# Patient Record
Sex: FEMALE | Race: WHITE | NOT HISPANIC OR LATINO | Employment: UNEMPLOYED
[De-identification: names, ages, dates, MRNs, and addresses within clinical notes are randomized per-mention and may not be internally consistent; named-entity substitution may affect disease eponyms.]

---

## 2017-09-03 ENCOUNTER — HEALTH MAINTENANCE LETTER (OUTPATIENT)
Age: 57
End: 2017-09-03

## 2022-04-28 ENCOUNTER — TRANSFERRED RECORDS (OUTPATIENT)
Dept: MULTI SPECIALTY CLINIC | Facility: CLINIC | Age: 62
End: 2022-04-28

## 2022-04-28 LAB — COLOGUARD-ABSTRACT: POSITIVE

## 2022-10-25 ENCOUNTER — TELEPHONE (OUTPATIENT)
Dept: FAMILY MEDICINE | Facility: CLINIC | Age: 62
End: 2022-10-25

## 2022-10-25 ENCOUNTER — ANTICOAGULATION THERAPY VISIT (OUTPATIENT)
Dept: ANTICOAGULATION | Facility: CLINIC | Age: 62
End: 2022-10-25

## 2022-10-25 ENCOUNTER — OFFICE VISIT (OUTPATIENT)
Dept: FAMILY MEDICINE | Facility: CLINIC | Age: 62
End: 2022-10-25
Payer: MEDICAID

## 2022-10-25 DIAGNOSIS — Z13.220 SCREENING FOR HYPERLIPIDEMIA: ICD-10-CM

## 2022-10-25 DIAGNOSIS — M79.671 BILATERAL FOOT PAIN: ICD-10-CM

## 2022-10-25 DIAGNOSIS — E66.01 MORBID OBESITY WITH BMI OF 50.0-59.9, ADULT (H): ICD-10-CM

## 2022-10-25 DIAGNOSIS — Z79.01 CHRONIC ANTICOAGULATION: Primary | ICD-10-CM

## 2022-10-25 DIAGNOSIS — Z11.59 NEED FOR HEPATITIS C SCREENING TEST: ICD-10-CM

## 2022-10-25 DIAGNOSIS — Z12.31 VISIT FOR SCREENING MAMMOGRAM: ICD-10-CM

## 2022-10-25 DIAGNOSIS — I27.20 PULMONARY HYPERTENSION (H): ICD-10-CM

## 2022-10-25 DIAGNOSIS — J96.11 CHRONIC RESPIRATORY FAILURE WITH HYPOXIA, ON HOME OXYGEN THERAPY (H): ICD-10-CM

## 2022-10-25 DIAGNOSIS — Z99.81 CHRONIC RESPIRATORY FAILURE WITH HYPOXIA, ON HOME OXYGEN THERAPY (H): ICD-10-CM

## 2022-10-25 DIAGNOSIS — E78.5 DYSLIPIDEMIA ASSOCIATED WITH TYPE 2 DIABETES MELLITUS (H): ICD-10-CM

## 2022-10-25 DIAGNOSIS — Z79.01 CHRONIC ANTICOAGULATION: ICD-10-CM

## 2022-10-25 DIAGNOSIS — I48.20 CHRONIC ATRIAL FIBRILLATION (H): ICD-10-CM

## 2022-10-25 DIAGNOSIS — J96.21 ACUTE ON CHRONIC RESPIRATORY FAILURE WITH HYPOXIA (H): ICD-10-CM

## 2022-10-25 DIAGNOSIS — E03.9 HYPOTHYROIDISM, UNSPECIFIED TYPE: ICD-10-CM

## 2022-10-25 DIAGNOSIS — Z11.4 SCREENING FOR HIV (HUMAN IMMUNODEFICIENCY VIRUS): ICD-10-CM

## 2022-10-25 DIAGNOSIS — E66.2 OBESITY HYPOVENTILATION SYNDROME (H): ICD-10-CM

## 2022-10-25 DIAGNOSIS — E11.69 DYSLIPIDEMIA ASSOCIATED WITH TYPE 2 DIABETES MELLITUS (H): ICD-10-CM

## 2022-10-25 DIAGNOSIS — M79.672 BILATERAL FOOT PAIN: ICD-10-CM

## 2022-10-25 DIAGNOSIS — I50.42 CHRONIC COMBINED SYSTOLIC AND DIASTOLIC CONGESTIVE HEART FAILURE (H): ICD-10-CM

## 2022-10-25 DIAGNOSIS — E11.8 TYPE 2 DIABETES MELLITUS WITH COMPLICATION, WITHOUT LONG-TERM CURRENT USE OF INSULIN (H): Primary | ICD-10-CM

## 2022-10-25 PROBLEM — I71.40 ABDOMINAL AORTIC ANEURYSM (AAA) WITHOUT RUPTURE (H): Status: ACTIVE | Noted: 2020-03-09

## 2022-10-25 PROBLEM — R79.0 LOW MAGNESIUM LEVELS: Status: ACTIVE | Noted: 2017-11-28

## 2022-10-25 PROBLEM — I51.7 CARDIOMEGALY: Status: ACTIVE | Noted: 2017-03-08

## 2022-10-25 PROBLEM — R19.5 POSITIVE COLORECTAL CANCER SCREENING USING COLOGUARD TEST: Status: ACTIVE | Noted: 2022-05-10

## 2022-10-25 PROBLEM — D50.9 IRON DEFICIENCY ANEMIA: Status: ACTIVE | Noted: 2017-07-04

## 2022-10-25 PROBLEM — L40.9 SCALP PSORIASIS: Status: ACTIVE | Noted: 2018-06-08

## 2022-10-25 PROBLEM — M17.0 PRIMARY OSTEOARTHRITIS OF BOTH KNEES: Status: ACTIVE | Noted: 2018-01-23

## 2022-10-25 PROBLEM — I20.89 CHRONIC STABLE ANGINA (H): Status: ACTIVE | Noted: 2021-11-27

## 2022-10-25 PROBLEM — L20.9 ATOPIC DERMATITIS, UNSPECIFIED TYPE: Status: ACTIVE | Noted: 2017-11-28

## 2022-10-25 LAB
ERYTHROCYTE [DISTWIDTH] IN BLOOD BY AUTOMATED COUNT: 14.8 % (ref 10–15)
HBA1C MFR BLD: 6 % (ref 0–5.6)
HCT VFR BLD AUTO: 41 % (ref 35–47)
HGB BLD-MCNC: 12.3 G/DL (ref 11.7–15.7)
INR PPP: 2.53 (ref 0.85–1.15)
MCH RBC QN AUTO: 30.5 PG (ref 26.5–33)
MCHC RBC AUTO-ENTMCNC: 30 G/DL (ref 31.5–36.5)
MCV RBC AUTO: 102 FL (ref 78–100)
PLATELET # BLD AUTO: 204 10E3/UL (ref 150–450)
RBC # BLD AUTO: 4.03 10E6/UL (ref 3.8–5.2)
WBC # BLD AUTO: 8.7 10E3/UL (ref 4–11)

## 2022-10-25 PROCEDURE — 99205 OFFICE O/P NEW HI 60 MIN: CPT | Mod: 25 | Performed by: PHYSICIAN ASSISTANT

## 2022-10-25 PROCEDURE — 87389 HIV-1 AG W/HIV-1&-2 AB AG IA: CPT | Performed by: PHYSICIAN ASSISTANT

## 2022-10-25 PROCEDURE — 85027 COMPLETE CBC AUTOMATED: CPT | Performed by: PHYSICIAN ASSISTANT

## 2022-10-25 PROCEDURE — 90686 IIV4 VACC NO PRSV 0.5 ML IM: CPT | Performed by: PHYSICIAN ASSISTANT

## 2022-10-25 PROCEDURE — 90471 IMMUNIZATION ADMIN: CPT | Performed by: PHYSICIAN ASSISTANT

## 2022-10-25 PROCEDURE — 83036 HEMOGLOBIN GLYCOSYLATED A1C: CPT | Performed by: PHYSICIAN ASSISTANT

## 2022-10-25 PROCEDURE — 80053 COMPREHEN METABOLIC PANEL: CPT | Performed by: PHYSICIAN ASSISTANT

## 2022-10-25 PROCEDURE — 0124A COVID-19,PF,PFIZER BOOSTER BIVALENT: CPT | Performed by: PHYSICIAN ASSISTANT

## 2022-10-25 PROCEDURE — 85610 PROTHROMBIN TIME: CPT | Performed by: PHYSICIAN ASSISTANT

## 2022-10-25 PROCEDURE — 84443 ASSAY THYROID STIM HORMONE: CPT | Performed by: PHYSICIAN ASSISTANT

## 2022-10-25 PROCEDURE — 80061 LIPID PANEL: CPT | Performed by: PHYSICIAN ASSISTANT

## 2022-10-25 PROCEDURE — 36415 COLL VENOUS BLD VENIPUNCTURE: CPT | Performed by: PHYSICIAN ASSISTANT

## 2022-10-25 PROCEDURE — 86803 HEPATITIS C AB TEST: CPT | Performed by: PHYSICIAN ASSISTANT

## 2022-10-25 PROCEDURE — 91312 COVID-19,PF,PFIZER BOOSTER BIVALENT: CPT | Performed by: PHYSICIAN ASSISTANT

## 2022-10-25 RX ORDER — METOPROLOL SUCCINATE 50 MG/1
50 TABLET, EXTENDED RELEASE ORAL
COMMUNITY
Start: 2022-10-03

## 2022-10-25 RX ORDER — POTASSIUM CHLORIDE 1500 MG/1
TABLET, EXTENDED RELEASE ORAL
COMMUNITY
Start: 2022-04-19 | End: 2023-01-20

## 2022-10-25 RX ORDER — LISINOPRIL 2.5 MG/1
1 TABLET ORAL DAILY
COMMUNITY
Start: 2022-10-03 | End: 2023-01-19

## 2022-10-25 RX ORDER — ERGOCALCIFEROL 1.25 MG/1
CAPSULE ORAL
COMMUNITY
Start: 2022-02-08

## 2022-10-25 RX ORDER — CETIRIZINE HYDROCHLORIDE 10 MG/1
TABLET ORAL
COMMUNITY
Start: 2022-06-01

## 2022-10-25 RX ORDER — WARFARIN SODIUM 5 MG/1
TABLET ORAL
COMMUNITY
Start: 2021-12-11 | End: 2022-12-13

## 2022-10-25 RX ORDER — CYCLOBENZAPRINE HCL 10 MG
TABLET ORAL
COMMUNITY
Start: 2022-08-02

## 2022-10-25 RX ORDER — LEVOTHYROXINE SODIUM 175 UG/1
1 TABLET ORAL
COMMUNITY
Start: 2022-04-19 | End: 2023-01-19

## 2022-10-25 RX ORDER — IRON POLYSACCHARIDE COMPLEX 150 MG
1 CAPSULE ORAL DAILY
COMMUNITY
Start: 2022-08-03 | End: 2022-12-13

## 2022-10-25 RX ORDER — LIRAGLUTIDE 6 MG/ML
1.8 INJECTION SUBCUTANEOUS
COMMUNITY
Start: 2022-04-26

## 2022-10-25 RX ORDER — ATORVASTATIN CALCIUM 40 MG/1
1 TABLET, FILM COATED ORAL DAILY
COMMUNITY
Start: 2022-10-03 | End: 2023-01-19

## 2022-10-25 RX ORDER — ALBUTEROL SULFATE 90 UG/1
2 AEROSOL, METERED RESPIRATORY (INHALATION) EVERY 6 HOURS
Qty: 18 G | Refills: 1 | Status: SHIPPED | OUTPATIENT
Start: 2022-10-25 | End: 2023-01-31

## 2022-10-25 RX ORDER — SPIRONOLACTONE 25 MG/1
1 TABLET ORAL DAILY
COMMUNITY
Start: 2021-10-15

## 2022-10-25 RX ORDER — TRAZODONE HYDROCHLORIDE 100 MG/1
2 TABLET ORAL AT BEDTIME
COMMUNITY
Start: 2022-10-03

## 2022-10-25 RX ORDER — PREGABALIN 100 MG/1
CAPSULE ORAL
COMMUNITY
Start: 2022-08-02

## 2022-10-25 NOTE — PROGRESS NOTES
Unable to reach Marlene today.    No instructions provided. Unable to leave voicemail.    Follow up required to confirm warfarin dose taken and assess for changes, needs warfarin dosing for tonight, expecting venous result to result after we leave for the night    Cora Miller RN  Anticoagulation Clinic  10/25/2022

## 2022-10-25 NOTE — Clinical Note
Positive cologuard 04/28/22.  Colonoscopy completed 6/1/22 at [facility]. Repeat in 10 years.

## 2022-10-25 NOTE — PROGRESS NOTES
Assessment & Plan     Type 2 diabetes mellitus with complication, without long-term current use of insulin (H)  Dyslipidemia associated with type 2 diabetes mellitus (H)  Patient states she does not refills of medications today. Will obtain labs. Significant time spent today in chart review, discussion of referrals needed.    - Comprehensive metabolic panel (BMP + Alb, Alk Phos, ALT, AST, Total. Bili, TP); Future  - CBC with platelets; Future  - Hemoglobin A1c; Future  - Comprehensive metabolic panel (BMP + Alb, Alk Phos, ALT, AST, Total. Bili, TP)  - CBC with platelets  - Hemoglobin A1c    Acute on chronic respiratory failure with hypoxia (H)  Chronic respiratory failure with hypoxia, on home oxygen therapy (H)  Obesity hypoventilation syndrome (H)  Pulmonary hypertension (H)  Initial O2 sat today was 71% which was very possibly an error. Patient able to speak in full sentences without shortness of breath. She is not on oxygen during the visit as she does not have portable tanks - these are being delivered to her home tomorrow.   - Adult Pulmonary Medicine Referral; Future  - albuterol (PROAIR HFA/PROVENTIL HFA/VENTOLIN HFA) 108 (90 Base) MCG/ACT inhaler; Inhale 2 puffs into the lungs every 6 hours    Chronic anticoagulation  Referral placed for INR clinic.   - Anticoagulation Clinic Referral  - INR; Future  - Adult Cardiology Eval  Referral; Future  - INR    Chronic combined systolic and diastolic congestive heart failure (H)  Chronic atrial fibrillation (H)  Patient needs follow up with cardiology. Referral placed. Last echo 12/8/21, cardiac cath 12/6/21 at [facility].  - Adult Cardiology Eval  Referral; Future    Screening for HIV (human immunodeficiency virus)  Screen.  - HIV Antigen Antibody Combo; Future  - HIV Antigen Antibody Combo    Bilateral foot pain  Patient reporting ongoing bilateral foot pain. Has a corn on her left great toe that causes discomfort. Would like to see podiatry.   -  Orthopedic  Referral; Future    Hypothyroidism, unspecified type  Recheck labs.   - TSH with free T4 reflex; Future  - TSH with free T4 reflex    Need for hepatitis C screening test  Screen.  - Hepatitis C Screen Reflex to HCV RNA Quant and Genotype; Future  - Hepatitis C Screen Reflex to HCV RNA Quant and Genotype  - Hepatitis C RNA, Quantitative by PCR with Confirmatory Reflex to Genotyping    Screening for hyperlipidemia  Screen.  - Lipid panel reflex to direct LDL Non-fasting; Future  - Lipid panel reflex to direct LDL Non-fasting    Morbid obesity with BMI of 50.0-59.9 (H)  Encouraged healthy diet and weight loss    Significant time spent today in chart review, medication reconciliation, placing referrals, ordering labs, disability parking, etc. Encouraged patient to follow up with one of my MD colleagues to establish care due to the complexity of her history. She understands.     Colonoscopy completed 6/2022. Sent to abstracting.        60 minutes spent on the date of the encounter doing chart review, history and exam, documentation and further activities per the note           Return in about 4 weeks (around 11/22/2022) for establish care visit .    Amparo Mclean PA-C  [facility] DOROTEO Rosario is a 62 year old accompanied by her friend- Valentine, presenting for the following health issues:  Establish Care (INR needed)      History of Present Illness       Back Pain:  She presents for follow up of back pain. Patient's back pain is a chronic problem.  Location of back pain:  Right lower back, left lower back, left side of neck and left shoulder  Description of back pain: dull ache, sharp and other  Back pain spreads: left knee    Since patient first noticed back pain, pain is: unchanged  Does back pain interfere with her job:  Not applicable      Diabetes:   She presents for follow up of diabetes.  She is checking home blood glucose a few times a week. She checks blood  "glucose before meals.  Blood glucose is never over 200 and sometimes under 70. She is aware of hypoglycemia symptoms including shakiness, weakness and confusion. She has no concerns regarding her diabetes at this time.  She is having redness, sores, or blisters on feet.         Hypothyroidism:     Since last visit, patient describes the following symptoms::  Dry skin    Headaches:   Since the patient's last clinic visit, headaches are: no change  The patient is getting headaches:  Everyday i feel like i can hear my brains pounding  She is able to do normal daily activities when she has a migraine.  The patient is taking the following rescue/relief medications:  Ibuprofen (Advil, Motrin) and Tylenol   Patient states \"I get some relief\" from the rescue/relief medications.   The patient is taking the following medications to prevent migraines:  No medications to prevent migraines  In the past 4 weeks, the patient has gone to an Urgent Care or Emergency Room 0 times times due to headaches.    She eats 2-3 servings of fruits and vegetables daily.She consumes 0 sweetened beverage(s) daily.She exercises with enough effort to increase her heart rate 9 or less minutes per day.  She exercises with enough effort to increase her heart rate 5 days per week. She is missing 1 dose(s) of medications per week.     Patient here today to establish care with [facility]. She recently moved from [place], [place]. Moved from [facility], now living with son. Has 2 sons who live in the [facility] area.      O2 sat at home has been around 95%. Ran out of tanks for when she needs to leave the house- getting some delivered. Not short of breath today.     Last labs were in July. Here for A1c check, INR check and to have referrals for specialists if needed. Would like disability parking.     Positive cologuard 04/28/22.   Colonoscopy completed 6/1/22 at [facility]. Repeat in 10 years.     Heart Failure  Last cardiology visit 02/2020 " "  Chronic afib. On warfarin.    Diabetes  Feels she eats a good diet. States she had a nutty bar and a brownie for breakfast today and that is all she has eaten (it is 2 pm).   Little to no exercise, has foot pain when she walks too much. Denies numbness or tingling into feet.   Last diabetic eye exam 2/7/22. She has posterior vitreous detachment with vitreous hemorrhage OS with a differential of likely poor preproliferative diabetic retinopathy.      Pulmonary hypertension  On home oxygen. 4-5 L.             Review of Systems   Constitutional, HEENT, cardiovascular, pulmonary, gi and gu systems are negative, except as otherwise noted.      Objective    /60 (BP Location: Right arm, Patient Position: Sitting, Cuff Size: Adult Regular)   Pulse 67   Temp 97.4  F (36.3  C) (Oral)   Ht 1.753 m (5' 9\")   Wt (!) 174.7 kg (385 lb 3.2 oz)   SpO2 (!) 87%   BMI 56.88 kg/m    Body mass index is 56.88 kg/m .  Physical Exam   GENERAL: healthy, alert and no distress  RESP: lungs clear to auscultation - no rales, rhonchi or wheezes  CV: regular rate and rhythm, normal S1 S2, no S3 or S4, no murmur, click or rub, no peripheral edema and peripheral pulses strong  Diabetic foot exam: normal DP and PT pulses and no trophic changes or ulcerative lesions. Corn on left great toe.        07/26/22  INR 3.4  GFR 56  A1c 6.0                          "

## 2022-10-25 NOTE — TELEPHONE ENCOUNTER
Anticoagulation Management    Discussed INR home monitoring program with Marlene Snider reviewing:      Elibigility requirements: >= 3 months of anticoagulation therapy, indication for chronic anticoagulation and order from provider    Required testing frequency (q1-2 weeks)    Home meters, testing supplies, meter training, and reporting of INR results done through an outside company. Patient would be contacted by home monitoring company to review insurance coverage with home monitoring company prior to enrolling.    [employer] would continue to receive and manage INR results.    Home monitoring application may take several weeks and must continue to follow up with recommended INR monitoring in clinic until receives monitor and training completed.     Home monitoring terms reviewed with patient      Patient agrees to frequency of testing as directed by referring provider ( weekly or biweekly) Yes    Testing to be performed during business hours of [facility] Yes    Patient agrees they have the skill (or a designated caregiver) necessary to perform the self test Yes    Patient agrees to report all INR results to INR home monitoring company Yes    Patient agrees to have additional INR test in clinic if a home result is critical Yes    Patient agrees to use a [employer] approved service provider and device for home monitoring Yes    Tan Bowman    Referring provider: Amparo Mclean PA-C    Referring providers Clinic Fax number [phone]    Marlene Snider is interested home INR monitoring and requests order be submitted.

## 2022-10-25 NOTE — PROGRESS NOTES
ANTICOAGULATION  MANAGEMENT: NEW REFERRAL      SUBJECTIVE/OBJECTIVE     Marlene Snider, a 62 year old female  is newly referred to [facility].    Anticoagulation:    Previously on warfarin: yes, currently on warfarin transferring anticoagulation management to [facility]. Previously managed by Virgil ABEL. Most recent warfarin dose 5 mg Mon/Wed/Fri and 7.5 mg ROW since 7/26/22.  Most recent INR 3.4  Warfarin initiation date (approximate): been on over 10 years   Indication(s): Atrial Fibrillation   Goal Range: 2.0-3.0   Anticoagulation Bridge/Overlap No   Referring provider: from PCP    General Dietary/Social Hx:    Typical vitamin K intake: low; consistent     Other dietary considerations: None     Social History: Typical alcohol intake: occasional    In the past 2 weeks, patient estimates taking medications as instructed % of time: rarely misses    Results:        No results for input(s): INR, [id number], F2, ALMWH in the last 168 hours.    Wt Readings from Last 2 Encounters:   10/25/22 (!) 174.7 kg (385 lb 3.2 oz)      There is no height or weight on file to calculate BMI.  No results found for: AST  Lab Results   Component Value Date    CR 0.60 06/09/2006     CrCl cannot be calculated (Patient's most recent lab result is older than the maximum 30 days allowed.).    ASSESSMENT       Goal INR 2-3, standard for indication(s) above    On warfarin > 30 days; maintenance dose has been established        PLAN     Dosing Instructions: Take 7.5 mg tonight with INR in      Summary  As of 10/25/2022    Full warfarin instructions:  5 mg every Mon, Wed, Fri; 7.5 mg all other days; Starting 10/25/2022   Next INR check:               Education provided:     Please call back if any changes to your diet, medications or how you've been taking warfarin    Contact [phone]  with any changes, questions or concerns.     Education still needed:     None required      Telephone  call with Marlene who verbalizes understanding and agrees to plan    Not scheduled yet    Standing orders placed in Epic: Point of Care INR (Lab 5000)    Plan made per [facility] anticoagulation protocol    Cora Miller RN  Anticoagulation Clinic  10/25/2022

## 2022-10-26 VITALS
WEIGHT: 293 LBS | HEIGHT: 69 IN | DIASTOLIC BLOOD PRESSURE: 60 MMHG | OXYGEN SATURATION: 87 % | SYSTOLIC BLOOD PRESSURE: 114 MMHG | TEMPERATURE: 97.4 F | HEART RATE: 67 BPM | BODY MASS INDEX: 43.4 KG/M2

## 2022-10-26 PROBLEM — R19.5 POSITIVE COLORECTAL CANCER SCREENING USING COLOGUARD TEST: Status: RESOLVED | Noted: 2022-05-10 | Resolved: 2022-10-26

## 2022-10-26 LAB
ALBUMIN SERPL-MCNC: 3.4 G/DL (ref 3.4–5)
ALP SERPL-CCNC: 140 U/L (ref 40–150)
ALT SERPL W P-5'-P-CCNC: 16 U/L (ref 0–50)
ANION GAP SERPL CALCULATED.3IONS-SCNC: 7 MMOL/L (ref 3–14)
AST SERPL W P-5'-P-CCNC: 17 U/L (ref 0–45)
BILIRUB SERPL-MCNC: 0.4 MG/DL (ref 0.2–1.3)
BUN SERPL-MCNC: 25 MG/DL (ref 7–30)
CALCIUM SERPL-MCNC: 8.4 MG/DL (ref 8.5–10.1)
CHLORIDE BLD-SCNC: 99 MMOL/L (ref 94–109)
CHOLEST SERPL-MCNC: 115 MG/DL
CO2 SERPL-SCNC: 33 MMOL/L (ref 20–32)
CREAT SERPL-MCNC: 0.75 MG/DL (ref 0.52–1.04)
FASTING STATUS PATIENT QL REPORTED: YES
GFR SERPL CREATININE-BSD FRML MDRD: >90 ML/MIN/1.73M2
GLUCOSE BLD-MCNC: 105 MG/DL (ref 70–99)
HCV AB SERPL QL IA: REACTIVE
HDLC SERPL-MCNC: 66 MG/DL
HIV 1+2 AB+HIV1 P24 AG SERPL QL IA: NONREACTIVE
LDLC SERPL CALC-MCNC: 37 MG/DL
NONHDLC SERPL-MCNC: 49 MG/DL
POTASSIUM BLD-SCNC: 4.6 MMOL/L (ref 3.4–5.3)
PROT SERPL-MCNC: 7.5 G/DL (ref 6.8–8.8)
SODIUM SERPL-SCNC: 139 MMOL/L (ref 133–144)
TRIGL SERPL-MCNC: 60 MG/DL
TSH SERPL DL<=0.005 MIU/L-ACNC: 1.38 MU/L (ref 0.4–4)

## 2022-10-26 NOTE — PROGRESS NOTES
ANTICOAGULATION MANAGEMENT     Marlene Snider 62 year old female is on warfarin with therapeutic INR result. (Goal INR 2.0-3.0)    Recent labs: (last 7 days)     10/25/22  1534   INR 2.53*       ASSESSMENT     See Below     PLAN     Recommended plan for no diet, medication or health factor changes affecting INR     Dosing Instructions: Continue your current warfarin dose with next INR in 3 weeks       Summary  As of 10/25/2022    Full warfarin instructions:  5 mg every Mon, Wed, Fri; 7.5 mg all other days; Starting 10/25/2022   Next INR check:  11/16/2022             Telephone call with Marlene who verbalizes understanding and agrees to plan    Lab visit scheduled    Education provided:     Goal range and lab monitoring: goal range and significance of current result    Plan made per [facility] anticoagulation protocol    Cora Miller RN  Anticoagulation Clinic  10/26/2022    _______________________________________________________________________     Anticoagulation Episode Summary     Current INR goal:  2.0-3.0   TTR:  --   Target end date:  Indefinite   Send INR reminders to:  FERNANDEZ SADLER    Indications    Chronic anticoagulation [Z79.01]           Comments:           Anticoagulation Care Providers     Provider Role Specialty Phone number    Amparo Mclean PA-C Referring Family Medicine [phone]

## 2022-12-06 ENCOUNTER — MEDICAL CORRESPONDENCE (OUTPATIENT)
Dept: HEALTH INFORMATION MANAGEMENT | Facility: CLINIC | Age: 62
End: 2022-12-06

## 2022-12-08 ENCOUNTER — TELEPHONE (OUTPATIENT)
Dept: ANTICOAGULATION | Facility: CLINIC | Age: 62
End: 2022-12-08

## 2022-12-08 NOTE — TELEPHONE ENCOUNTER
ANTICOAGULATION     Marlene Snider is overdue for INR check.      Spoke with Marlene who declined to schedule a lab appointment at this time. If calling back please schedule as soon as possible.    Yakelin Couch RN

## 2022-12-13 DIAGNOSIS — Z79.01 CHRONIC ANTICOAGULATION: ICD-10-CM

## 2022-12-13 DIAGNOSIS — D50.8 OTHER IRON DEFICIENCY ANEMIA: Primary | ICD-10-CM

## 2022-12-13 RX ORDER — WARFARIN SODIUM 5 MG/1
TABLET ORAL
Qty: 40 TABLET | Refills: 0 | Status: SHIPPED | OUTPATIENT
Start: 2022-12-13 | End: 2023-01-19

## 2022-12-13 RX ORDER — IRON POLYSACCHARIDE COMPLEX 150 MG
150 CAPSULE ORAL DAILY
Qty: 90 CAPSULE | Refills: 1 | Status: SHIPPED | OUTPATIENT
Start: 2022-12-13 | End: 2023-01-31

## 2022-12-13 NOTE — TELEPHONE ENCOUNTER
ANTICOAGULATION MANAGEMENT:  Medication Refill    Anticoagulation Summary  As of 10/25/2022    Warfarin maintenance plan:  5 mg (5 mg x 1) every Mon, Wed, Fri; 7.5 mg (5 mg x 1.5) all other days; Starting 10/25/2022   Next INR check:  11/16/2022   Target end date:  Indefinite    Indications    Chronic anticoagulation [Z79.01]             Anticoagulation Care Providers     Provider Role Specialty Phone number    Amparo Mclean PA-C Referring Family Medicine [phone]          Visit with referring provider/group within last year: Yes    [facility] referral signed within last year: Yes    Marlene does NOT meet all criteria for refill: > 2 weeks overdue for lab monitoring . 30 day umer fill approved; patient notified to schedule labs per [facility] protocol    Cora Miller RN  Anticoagulation Clinic

## 2022-12-13 NOTE — TELEPHONE ENCOUNTER
Patient needs an INR follow-up with the INR RN team in order to determine if she is within her goal of 2-3    Will only give one month refill until lab appointment is scheduled       Dr. Vanessa Frank

## 2022-12-15 ENCOUNTER — DOCUMENTATION ONLY (OUTPATIENT)
Dept: ANTICOAGULATION | Facility: CLINIC | Age: 62
End: 2022-12-15

## 2022-12-15 NOTE — LETTER
[facility]TICO[facility]UL[facility]  [address] [facility] [address]  Phone: [phone]  Fax: [phone]       December 15, 2022        Marlene Snider  [address] [facility] [facility] [place] [facility] [place] [address]            Dear Marlene,    You are currently under the care of [facility] for your warfarin (Coumadin , Jantoven ) therapy.  We are contacting you because our records show you were due for an INR on 11/16/22.    There are potentially serious risks when taking warfarin without careful monitoring and we want to make sure you are safely managed.  Routine lab monitoring is required for warfarin refills.     Please call [phone]  as soon as possible to schedule an appointment.  If there has been a change in your care or other concerns, please let us know so we can help and or update our records.     Sincerely,       [facility]

## 2022-12-15 NOTE — PROGRESS NOTES
ANTICOAGULATION     Marlene Snider is overdue for INR check.      Reminder letter sent    Cora Miller RN

## 2022-12-26 ENCOUNTER — HEALTH MAINTENANCE LETTER (OUTPATIENT)
Age: 62
End: 2022-12-26

## 2022-12-29 ENCOUNTER — TELEPHONE (OUTPATIENT)
Dept: ANTICOAGULATION | Facility: CLINIC | Age: 62
End: 2022-12-29

## 2022-12-29 NOTE — LETTER
[facility]TICO[facility]UL[facility]  [address] [facility] [address]  Phone: [phone]  Fax: [phone]   December 29, 2022        Marlene Snider  [address] [place] [place] [address]            Dear Marlene,    You are currently under the care of [facility] for your warfarin (Coumadin , Jantoven ) therapy.  We are contacting you because our records show you were due for an INR on 11/16/22.    There are potentially serious risks when taking warfarin without careful monitoring and we want to make sure you are safely managed.  Routine lab monitoring is required for warfarin refills.     Please call [phone]  as soon as possible to schedule an appointment.  If there has been a change in your care or other concerns, please let us know so we can help and or update our records.     Sincerely,       [facility]

## 2022-12-29 NOTE — TELEPHONE ENCOUNTER
Anticoagulation clinic notificiation    Amparo Nelson  ,    Your patient, Marlene Snider, is past due for an INR. Their last result was 2.53 on 10/28 and was due to come back on 11/16.    Marlene Snider received phone calls and letters over the last several weeks in attempt to arrange a follow up appointment.  Marlene Snider will be sent another letter today.     No action is required from you unless you have additional information or if you would like to reach out personally to Marlene Snider.    Please don t hesitate to contact the Anticoagulation Management Program if you have any questions or concerns.     Sincerely,     [facility]

## 2023-01-20 DIAGNOSIS — I48.20 CHRONIC ATRIAL FIBRILLATION (H): Primary | ICD-10-CM

## 2023-01-20 NOTE — TELEPHONE ENCOUNTER
Pending Prescriptions:                       Disp   Refills    potassium chloride ER (KLOR-CON M) 20 MEQ*

## 2023-01-23 RX ORDER — POTASSIUM CHLORIDE 1500 MG/1
20 TABLET, EXTENDED RELEASE ORAL 2 TIMES DAILY
Qty: 180 TABLET | Refills: 1 | Status: SHIPPED | OUTPATIENT
Start: 2023-01-23 | End: 2023-01-31

## 2023-01-24 ENCOUNTER — ANTICOAGULATION THERAPY VISIT (OUTPATIENT)
Dept: ANTICOAGULATION | Facility: CLINIC | Age: 63
End: 2023-01-24
Payer: MEDICAID

## 2023-01-24 ENCOUNTER — TRANSFERRED RECORDS (OUTPATIENT)
Dept: HEALTH INFORMATION MANAGEMENT | Facility: CLINIC | Age: 63
End: 2023-01-24
Payer: MEDICAID

## 2023-01-24 DIAGNOSIS — Z79.01 CHRONIC ANTICOAGULATION: Primary | ICD-10-CM

## 2023-01-24 LAB — INR (EXTERNAL): 5.4 (ref 0.9–1.1)

## 2023-01-24 NOTE — PROGRESS NOTES
ANTICOAGULATION MANAGEMENT     Marlene GARLAND Tylor 62 year old female is on warfarin with supratherapeutic INR result. (Goal INR 2.0-3.0)    Recent labs: (last 7 days)     01/24/23  1328   INR 5.4*       ASSESSMENT       Source(s): Chart Review and Patient/Caregiver Call       Warfarin doses taken: Warfarin taken as instructed    Diet: No new diet changes identified    New illness, injury, or hospitalization: Yes: Marlene reports that she was hospitalized in November but there were no med changes    Medication/supplement changes: None noted    Signs or symptoms of bleeding or clotting: No    Previous INR: Therapeutic. Marlene is very overdue, last INR was 10/25/22    Additional findings: This is her first test with new MD INR home monitor       PLAN     Recommended plan for no diet, medication or health factor changes affecting INR     Dosing Instructions: hold 2 doses. Tentatively updated maintenance with decreased dose (11% change), may need further adjustment based on INR response to holding. Next INR in 2 days       Summary  As of 1/24/2023    Full warfarin instructions:  1/24: Hold; 1/25: Hold; Otherwise 7.5 mg every Tue, Sat; 5 mg all other days   Next INR check:  1/26/2023             Telephone call with Marlene who verbalizes understanding and agrees to plan    Patient to recheck with home meter    Education provided:     Goal range and lab monitoring: goal range and significance of current result and Importance of following up at instructed interval    Symptom monitoring: monitoring for bleeding signs and symptoms    Contact [phone]  with any changes, questions or concerns.     Plan made per [facility] anticoagulation protocol    Octavia Butt RN  Anticoagulation Clinic  1/24/2023    _______________________________________________________________________     Anticoagulation Episode Summary     Current INR goal:  2.0-3.0   TTR:  --   Target end date:  Indefinite   Send INR reminders to:  [facility]     Indications    Chronic anticoagulation [Z79.01]           Comments:  MD INR monitor         Anticoagulation Care Providers     Provider Role Specialty Phone number    Amparo Mclean PA-C [facility] [phone]

## 2023-01-26 ENCOUNTER — VIRTUAL VISIT (OUTPATIENT)
Dept: INTERNAL MEDICINE | Facility: CLINIC | Age: 63
End: 2023-01-26
Payer: MEDICAID

## 2023-01-26 DIAGNOSIS — E66.2 OBESITY HYPOVENTILATION SYNDROME (H): ICD-10-CM

## 2023-01-26 DIAGNOSIS — I48.20 CHRONIC ATRIAL FIBRILLATION (H): ICD-10-CM

## 2023-01-26 DIAGNOSIS — Z79.01 CHRONIC ANTICOAGULATION: ICD-10-CM

## 2023-01-26 DIAGNOSIS — R09.02 HYPOXEMIA: ICD-10-CM

## 2023-01-26 DIAGNOSIS — J96.21 ACUTE ON CHRONIC RESPIRATORY FAILURE WITH HYPOXIA (H): Primary | ICD-10-CM

## 2023-01-26 PROCEDURE — 99443 PR PHYSICIAN TELEPHONE EVALUATION 21-30 MIN: CPT | Mod: 93 | Performed by: INTERNAL MEDICINE

## 2023-01-26 NOTE — PROGRESS NOTES
"Marlene is a 62 year old who is being evaluated via a billable video visit.      How would you like to obtain your AVS? MyChart  If the video visit is dropped, the invitation should be resent by: Text to cell phone: [phone]  Will anyone else be joining your video visit? No  {If patient encounters technical issues they should call [phone] :802722}        {PROVIDER CHARTING PREFERENCE:109422}    Subjective   Marlene is a 62 year old{ACCOMPANIED BY STATEMENT (Optional):621935}, presenting for the following health issues:  Establish Care      HPI     {SUPERLIST (Optional):183716}  {additonal problems for provider to add (Optional):180707}    Review of Systems   {ROS COMP (Optional):181913}      Objective           Vitals:  No vitals were obtained today due to virtual visit.    Physical Exam   {video visit exam brief selected:457515::\"GENERAL: Healthy, alert and no distress\",\"EYES: Eyes grossly normal to inspection.  No discharge or erythema, or obvious scleral/conjunctival abnormalities.\",\"RESP: No audible wheeze, cough, or visible cyanosis.  No visible retractions or increased work of breathing.  \",\"SKIN: Visible skin clear. No significant rash, abnormal pigmentation or lesions.\",\"NEURO: Cranial nerves grossly intact.  Mentation and speech appropriate for age.\",\"PSYCH: Mentation appears normal, affect normal/bright, judgement and insight intact, normal speech and appearance well-groomed.\"}    {Diagnostic Test Results (Optional):827344}    {AMBULATORY ATTESTATION (Optional):488450}        Video-Visit Details    Type of service:  Video Visit   Video Start Time: {video visit start/end time for provider to select:793905}  Video End Time:{video visit start/end time for provider to select:897802}    Originating Location (pt. Location): {video visit patient location:588420::\"Home\"}  {PROVIDER LOCATION On-site should be selected for visits conducted from your clinic location or adjoining [facility], academic office, " "or other nearby [facility] building. Off-site should be selected for all other provider locations, including home:176455}  Distant Location (provider location):  {virtual location provider:704963}  Platform used for Video Visit: {Virtual Visit Platforms:138851::\"tracx\"}    "

## 2023-01-26 NOTE — PROGRESS NOTES
Marlene is a 62 year old who is being evaluated via a billable telephone visit.      What phone number would you like to be contacted at? [phone]  How would you like to obtain your AVS? Andres  Distant Location (provider location):  On-site    5:55 PM start  6:19 PM end    Assessment & Plan     Acute on chronic respiratory failure with hypoxia (H)  I am meeting with this patient for the first time with a telephone MD visit. This is a patient with an extremely highly complex challenging et of problems. I reviewed her discharge summary from [facility] from 11--22-22 until December 2nd-2022 and am very impressed with the complexity and length, breadth, depth and complexity of her past medical history. Looks like the primary  here is super-morbid obesity with body mass index above 40 and associated severe hypoxema due to obesity hypoventilation syndrome , and she is said to be dependent upon Bilevel positive airway pressure (BPAP) but when she left the hospital she says she was discharged with no Bilevel positive airway pressure (BPAP). She was supposed to have an appointment with a pulmonologist but was a no show appointment secondary to unable to get into the private motor vehicle that was supposed to drive her for this appointment. Finally the chief complaint of today's office visit is patient needs home health care and we did go through the home health care orders  Together. This is a logical starting point for this patient. I strongly encouraged she make an appointment to see me within the next month as there is no possible way I could do justice with this patient via a telephone MD visit. This is explained to patient in significant detail . I also add that it is not entirely clear to me how much of her requests per home health care will be met, especially for example her requests also for a home health aide.  - Home Care Referral    Chronic anticoagulation  She has a [employer] INRatio  2 PT/INR  Monitor  But needs help learning to use this and possibly needs an enrollment into the anticoagulation clinic   - Home Care Referral    Chronic atrial fibrillation (H)  As above   - Home Care Referral    Obesity hypoventilation syndrome (H)  As above   - Home Care Referral    Hypoxemia  As above   - Home Care Referral    Review of the result(s) of each unique test - discharge summary from [facility]  Prescription drug management  24 minutes spent on the date of the encounter doing chart review, history and exam, documentation and further activities per the note      Return in about 4 weeks (around 2/23/2023).    Devon Tran MD  [facility] [place] is a 62 year old presenting for the following health issues:  Establish Care      Today is what seems like an impossible task. Today is my first office visit with this patient who is clearly a highly complex challenging patient with multiple medical problems but it is scheduled as a telephone MD visit, the least useful option and I emphasized to patient that it isn't fair to describe today's office visit as a true get acquainted visit with a new patient to the Internal Medicine department. We simply chatted on the phone and I reviewed some of her dense chart documentation and I agree to place orders for home health care with a further follow up appointment with me recommended in one month       Acute respiratory failure with hypercapnia (HC) (Primary Dx);   Obesity hypoventilation syndrome (HC); noncompliant with home CPAP [ continuous positive airway pressure] , on home oxygen 3-4 liters per nasal cannula  , said to be n Bilevel positive airway pressure (BPAP) , avoid sedating agents, continue with diuretic therapy , continue current plan of care with oxygen   Morbid obesity (HC); body mass index over 40  Chronic a-fib (HC) on coumadin    Also with diagnosis of diastolic congestive heart failure    Most recent previous  hospitalization was 11-22-22 until 12-2-22  To see pulmonologist - Outpatient follow-up with the sleep center  An extensive discussion took place with the patient regarding recurrent episodes of hypoxia, patient understands due to her significant obesity hypoventilation, she continues to fall asleep during daytime which causes episodes of hypoxia and that the only treatment with the using BiPAP during daytime naps as oxygen alone is not sufficient. We will defer further management to the outpatient pulmonary/sleep specialist.  Patient to continue her BiPAP per home settings      Home health care referral is needed to help her around the house- son used to be more available but is working more and more        Patient notes her health is deteriorated  And she can't do much      Other issues include  Hypothyroidism, diabetes mellitus type 2 , chronic atrial fibrillation , other issues     HPI   Patient is requesting a homecare referral. She stated she needs help around the house carrying groceries, meal prep, cleaning, and etc.           Review of Systems   Constitutional, HEENT, cardiovascular, pulmonary, gi and gu systems are negative, except as otherwise noted.      Objective           Vitals:  No vitals were obtained today due to virtual visit.    Physical Exam   healthy, alert and no distress  PSYCH: Alert and oriented times 3; coherent speech, normal   rate and volume, able to articulate logical thoughts, able   to abstract reason, no tangential thoughts, no hallucinations   or delusions  Her affect is normal  RESP: No cough, no audible wheezing, able to talk in full sentences  Remainder of exam unable to be completed due to telephone visits    Orders Placed This Encounter   Procedures     Home Care Referral             Phone call duration: 28 minutes

## 2023-01-27 ENCOUNTER — DOCUMENTATION ONLY (OUTPATIENT)
Dept: ANTICOAGULATION | Facility: CLINIC | Age: 63
End: 2023-01-27
Payer: MEDICAID

## 2023-01-27 ENCOUNTER — TELEPHONE (OUTPATIENT)
Dept: INTERNAL MEDICINE | Facility: CLINIC | Age: 63
End: 2023-01-27
Payer: MEDICAID

## 2023-01-27 ENCOUNTER — TELEPHONE (OUTPATIENT)
Dept: FAMILY MEDICINE | Facility: CLINIC | Age: 63
End: 2023-01-27
Payer: MEDICAID

## 2023-01-27 DIAGNOSIS — E11.69 DYSLIPIDEMIA ASSOCIATED WITH TYPE 2 DIABETES MELLITUS (H): ICD-10-CM

## 2023-01-27 DIAGNOSIS — I48.20 CHRONIC ATRIAL FIBRILLATION (H): ICD-10-CM

## 2023-01-27 DIAGNOSIS — E11.8 TYPE 2 DIABETES MELLITUS WITH COMPLICATION, WITHOUT LONG-TERM CURRENT USE OF INSULIN (H): ICD-10-CM

## 2023-01-27 DIAGNOSIS — D50.8 OTHER IRON DEFICIENCY ANEMIA: ICD-10-CM

## 2023-01-27 DIAGNOSIS — E78.5 DYSLIPIDEMIA ASSOCIATED WITH TYPE 2 DIABETES MELLITUS (H): ICD-10-CM

## 2023-01-27 DIAGNOSIS — Z79.01 CHRONIC ANTICOAGULATION: ICD-10-CM

## 2023-01-27 DIAGNOSIS — E03.9 HYPOTHYROIDISM, UNSPECIFIED TYPE: ICD-10-CM

## 2023-01-27 DIAGNOSIS — E66.2 OBESITY HYPOVENTILATION SYNDROME (H): ICD-10-CM

## 2023-01-27 DIAGNOSIS — J96.21 ACUTE ON CHRONIC RESPIRATORY FAILURE WITH HYPOXIA (H): Primary | ICD-10-CM

## 2023-01-27 RX ORDER — LISINOPRIL 2.5 MG/1
2.5 TABLET ORAL DAILY
Qty: 30 TABLET | Refills: 0 | Status: CANCELLED | OUTPATIENT
Start: 2023-01-27

## 2023-01-27 RX ORDER — METOPROLOL SUCCINATE 50 MG/1
50 TABLET, EXTENDED RELEASE ORAL
Status: CANCELLED | OUTPATIENT
Start: 2023-01-27

## 2023-01-27 RX ORDER — CYCLOBENZAPRINE HCL 10 MG
TABLET ORAL
Status: CANCELLED | OUTPATIENT
Start: 2023-01-27

## 2023-01-27 RX ORDER — LEVOTHYROXINE SODIUM 175 UG/1
175 TABLET ORAL
Qty: 30 TABLET | Refills: 0 | Status: CANCELLED | OUTPATIENT
Start: 2023-01-27

## 2023-01-27 RX ORDER — SPIRONOLACTONE 25 MG/1
25 TABLET ORAL DAILY
Status: CANCELLED | OUTPATIENT
Start: 2023-01-27

## 2023-01-27 RX ORDER — TRAZODONE HYDROCHLORIDE 100 MG/1
200 TABLET ORAL AT BEDTIME
Status: CANCELLED | OUTPATIENT
Start: 2023-01-27

## 2023-01-27 RX ORDER — ERGOCALCIFEROL 1.25 MG/1
CAPSULE ORAL
Status: CANCELLED | OUTPATIENT
Start: 2023-01-27

## 2023-01-27 RX ORDER — CETIRIZINE HYDROCHLORIDE 10 MG/1
TABLET ORAL
Status: CANCELLED | OUTPATIENT
Start: 2023-01-27

## 2023-01-27 RX ORDER — LIRAGLUTIDE 6 MG/ML
1.8 INJECTION SUBCUTANEOUS
Status: CANCELLED | OUTPATIENT
Start: 2023-01-27

## 2023-01-27 RX ORDER — WARFARIN SODIUM 5 MG/1
TABLET ORAL
Qty: 110 TABLET | Refills: 0 | Status: CANCELLED | OUTPATIENT
Start: 2023-01-27

## 2023-01-27 RX ORDER — ALBUTEROL SULFATE 90 UG/1
2 AEROSOL, METERED RESPIRATORY (INHALATION) EVERY 6 HOURS
Qty: 18 G | Refills: 1 | Status: CANCELLED | OUTPATIENT
Start: 2023-01-27

## 2023-01-27 RX ORDER — POTASSIUM CHLORIDE 1500 MG/1
20 TABLET, EXTENDED RELEASE ORAL 2 TIMES DAILY
Qty: 180 TABLET | Refills: 1 | Status: CANCELLED | OUTPATIENT
Start: 2023-01-27

## 2023-01-27 RX ORDER — ATORVASTATIN CALCIUM 40 MG/1
40 TABLET, FILM COATED ORAL DAILY
Qty: 30 TABLET | Refills: 0 | Status: CANCELLED | OUTPATIENT
Start: 2023-01-27

## 2023-01-27 RX ORDER — IRON POLYSACCHARIDE COMPLEX 150 MG
150 CAPSULE ORAL DAILY
Qty: 90 CAPSULE | Refills: 1 | Status: CANCELLED | OUTPATIENT
Start: 2023-01-27

## 2023-01-27 RX ORDER — PREGABALIN 100 MG/1
CAPSULE ORAL
Status: CANCELLED | OUTPATIENT
Start: 2023-01-27

## 2023-01-27 NOTE — LETTER
[facility] ANTICOAGULATION CLINIC  [address] [facility] [address]  Phone: [phone]  Fax: [phone]   January 27, 2023        Marlene Snider  [address] [place] [address]            Dear Marlene,    You are currently under the care of [facility] for your warfarin (Coumadin , Jantoven ) therapy.  We are contacting you because our records show you were due for an INR on 1/26/23.    There are potentially serious risks when taking warfarin without careful monitoring and we want to make sure you are safely managed.  Routine lab monitoring is required for warfarin refills.     Please call [phone]  as soon as possible to schedule an appointment.  If there has been a change in your care or other concerns, please let us know so we can help and or update our records.     Sincerely,       [facility]

## 2023-01-27 NOTE — TELEPHONE ENCOUNTER
Please call pt and confirm she wants to switch to this pharmacy and ask for the names of the medications she wants sent there.  Thank you  Marlene RENEEN, RN

## 2023-01-27 NOTE — TELEPHONE ENCOUNTER
Devon Tran MD   [facility] [employer]  We need help with this home health care     Devon Tran MD             Previous Messages     ----- Message -----   From: Niki Mahmood   Sent: 1/27/2023  11:47 AM CST   To: Devon Tran MD   Subject:  HOME HEALTH REFERRAL DENIAL- ACCENT CARE FA*     We received a home health referral for this patient , however we are unable to accept it due to capacity. If you have any further questions you can contact me at [phone] ext [phone]. Thank you     Niki Mahmood Lvn   Clinical    [employer].

## 2023-01-27 NOTE — TELEPHONE ENCOUNTER
Primary care referral entered to assist with finding a home care for pt.     Latanya Nicole RN  [facility]

## 2023-01-27 NOTE — TELEPHONE ENCOUNTER
Pharmacy comments:  Your patient has chosen [facility] for their medication.To make this transition, we need new prescriptions from you.

## 2023-01-27 NOTE — PROGRESS NOTES
ANTICOAGULATION     Marlene Snider is overdue for INR check.      Was unable to reach patient and was unable to leave a voicemail. If patient calls, please schedule INR check as soon as possible.  and Reminder letter sent    Jaki Vasquez RN

## 2023-01-30 ENCOUNTER — PATIENT OUTREACH (OUTPATIENT)
Dept: CARE COORDINATION | Facility: CLINIC | Age: 63
End: 2023-01-30
Payer: MEDICAID

## 2023-01-30 NOTE — PROGRESS NOTES
Patient has [place] insurance-  Limited home care accessibility-Order for home care sent to [facility] and [facility] agencies.    CHW will wait for response from agencies if they have capacity for patient.     Vero Guzman, CHW  [place], [place], [place], [place] and [facility]  [phone]

## 2023-01-30 NOTE — TELEPHONE ENCOUNTER
Spoke with pt, she does want below pharmacy for future orders, however, she does not need refills at this time. Told pt that new pharmacy is starred and that we should be able to send to new pharmacy without issues when she is ready for refills. Told pt she has to call us when ready for refills. Verbalized understanding.     Thanks,  SURYA Pelaez  [facility]

## 2023-01-30 NOTE — LETTER
1/30/2023         This is a Home Care Order from PCP. Requesting agency to review and call back with either acceptance or denial for patient.         Point of contact: MAL Cantu  [facility] and [facility]  [phone]          Documentation of Face to Face and Certification for Home Health Services     I attest that I saw or will see Marlene GARLAND Tylor on this date:  1/26/2023     This encounter with the patient was in whole, or in part, for medical condition, which is the primary reason for Home Health Care:       Patient Active Problem List   Diagnosis     Abdominal aortic aneurysm (AAA) without rupture     Acute on chronic respiratory failure with hypoxia (H)     Atopic dermatitis, unspecified type     Cardiomegaly     Chronic anticoagulation     Chronic atrial fibrillation (H)     Chronic combined systolic and diastolic congestive heart failure (H)     Chronic stable angina (H)     Hypothyroidism, unspecified type     Hypoxemia     Insomnia, unspecified type     Iron deficiency anemia     Low magnesium levels     Obesity hypoventilation syndrome (H)     Primary osteoarthritis of both knees     Pulmonary hypertension (H)     Requires continuous at home supplemental oxygen     Scalp psoriasis     Dyslipidemia associated with type 2 diabetes mellitus (H)     Type 2 diabetes mellitus with complication, without long-term current use of insulin (H)      I certify that, based on my findings, the following services are medically necessary Home Health Services: Skilled Nursing  Physical Therapy New INR teaching and monitoring  Complex aftercare  Therapeutic Exercise     Additional services needed: Home Health Aide       Further, I certify that my clinical findings support that this patient is homebound (i.e. absences from home require considerable and taxing effort and are for medical reasons or [facility] services or infrequently or of short duration when for other  reasons) related to:Dyspnea on exertion that makes it unsafe to leave home without clinical deterioration  Patient has difficulty ambulating >100 ft  Requires assistance of another person or specialized equipment is needed     Based on the above findings, I certify that this patient is confined to the home and needs intermittent skilled nursing care, physical therapy and/or speech therapy. The patient is under my care, and I have initiated the establishment of the plan of care. This patient will be followed by a physician who will periodically review the plan of care.        Physician/Provider to provide follow up care: Provider to follow patient: LAURA BARNES [[id number]]        Please be aware that coverage of these services is subject to the terms and limitations of your health insurance plan.  Call member services at your health plan with any benefit or coverage questions.  _______________________________________________________________________  Authorized by:  Devon Tran MD       PLEASE EVALUATE AND TREAT (Evaluation timeline is 24 - 48 hrs. Please call if there is need for a variance to this timeline).     Medications:         Current Outpatient Medications   Medication Sig Dispense Refill     albuterol (PROAIR HFA/PROVENTIL HFA/VENTOLIN HFA) 108 (90 Base) MCG/ACT inhaler Inhale 2 puffs into the lungs every 6 hours 18 g 1     aspirin (ASA) 81 MG EC tablet TAKE 1 TABLET BY MOUTH ONE TIME A DAY. DO NOT SPLIT OR CRUSH.         cetirizine (ZYRTEC) 10 MG tablet TAKE 1 TABLET BY MOUTH 1 TIME A DAY GENERIC ZYRTEC         cyclobenzaprine (FLEXERIL) 10 MG tablet TAKE ONE TABLET BY MOUTH AT BEDTIME AS NEEDED GENERIC FLEXERIL         ergocalciferol (ERGOCALCIFEROL) 1.25 MG (82559 UT) capsule TAKE ONE CAPSULE BY MOUTH ONE TIME A WEEK.         iron polysaccharides (NIFEREX) 150 MG capsule Take 1 capsule (150 mg) by mouth daily 90 capsule 1     levothyroxine (SYNTHROID/LEVOTHROID) 175 MCG tablet Take 1 tablet (175 mcg) by  mouth daily before breakfast 30 tablet 0     liraglutide (VICTOZA PEN) 18 MG/3ML solution Inject 1.8 mg Subcutaneous         lisinopril (ZESTRIL) 2.5 MG tablet Take 1 tablet (2.5 mg) by mouth daily 30 tablet 0     metoprolol succinate ER (TOPROL XL) 50 MG 24 hr tablet Take 50 mg by mouth         omeprazole (PRILOSEC) 20 MG DR capsule TAKE 1 CAPSULE BY MOUTH EVERY MORNING BEFORE BREAKFAST. TAKE BEFORE MEALS. DO NOT CRUSH. GENERIC PRILOSEC         potassium chloride ER (KLOR-CON M) 20 MEQ CR tablet Take 1 tablet (20 mEq) by mouth 2 times daily 180 tablet 1     pregabalin (LYRICA) 100 MG capsule TAKE ONE CAPSULE BY MOUTH THREE TIMES A DAY. GENERIC LYRICA         spironolactone (ALDACTONE) 25 MG tablet Take 1 tablet by mouth daily         traZODone (DESYREL) 100 MG tablet Take 2 tablets by mouth At Bedtime         warfarin ANTICOAGULANT (COUMADIN) 5 MG tablet Take warfarin 5 mg (1 tablet) by mouth Monday, Wednesday, Friday and warfarin 7.5 mg (1 and 1/2 tablet) all other days of the week. Strength: 5 mg 110 tablet 0     atorvastatin (LIPITOR) 40 MG tablet Take 1 tablet (40 mg) by mouth daily (Patient not taking: Reported on 1/26/2023) 30 tablet 0      Problems:      Patient Active Problem List   Diagnosis     Abdominal aortic aneurysm (AAA) without rupture     Acute on chronic respiratory failure with hypoxia (H)     Atopic dermatitis, unspecified type     Cardiomegaly     Chronic anticoagulation     Chronic atrial fibrillation (H)     Chronic combined systolic and diastolic congestive heart failure (H)     Chronic stable angina (H)     Hypothyroidism, unspecified type     Hypoxemia     Insomnia, unspecified type     Iron deficiency anemia     Low magnesium levels     Obesity hypoventilation syndrome (H)     Primary osteoarthritis of both knees     Pulmonary hypertension (H)     Requires continuous at home supplemental oxygen     Scalp psoriasis     Dyslipidemia associated with type 2 diabetes mellitus (H)     Type 2  diabetes mellitus with complication, without long-term current use of insulin (H)      Diet:  None        Code Status:    Code Status: Not on file     Allergies:  Patient has no known allergies.           Patient Demographics    Address   [address]   [place] [address] Phone   [phone] (Home) *Preferred*   [phone] (Mobile) E-mail Address   [email or web address]      Insurance:      Coverage information:     Subscriber: [id number] RAJNI BARRERA     Rel to sub: 01 - Self     Member ID: [id number]     Payor: 34-[place] Ph: [phone]     Benefit plan: [id number]-[place]     Group number: Not given     Member effective dates: from 12/31/22

## 2023-01-31 ENCOUNTER — PATIENT OUTREACH (OUTPATIENT)
Dept: CARE COORDINATION | Facility: CLINIC | Age: 63
End: 2023-01-31
Payer: MEDICAID

## 2023-01-31 ENCOUNTER — TELEPHONE (OUTPATIENT)
Dept: FAMILY MEDICINE | Facility: CLINIC | Age: 63
End: 2023-01-31
Payer: MEDICAID

## 2023-01-31 DIAGNOSIS — E66.2 OBESITY HYPOVENTILATION SYNDROME (H): ICD-10-CM

## 2023-01-31 DIAGNOSIS — Z79.01 CHRONIC ANTICOAGULATION: ICD-10-CM

## 2023-01-31 DIAGNOSIS — E78.5 DYSLIPIDEMIA ASSOCIATED WITH TYPE 2 DIABETES MELLITUS (H): ICD-10-CM

## 2023-01-31 DIAGNOSIS — D50.8 OTHER IRON DEFICIENCY ANEMIA: ICD-10-CM

## 2023-01-31 DIAGNOSIS — E11.69 DYSLIPIDEMIA ASSOCIATED WITH TYPE 2 DIABETES MELLITUS (H): ICD-10-CM

## 2023-01-31 DIAGNOSIS — I48.20 CHRONIC ATRIAL FIBRILLATION (H): ICD-10-CM

## 2023-01-31 DIAGNOSIS — E03.9 HYPOTHYROIDISM, UNSPECIFIED TYPE: ICD-10-CM

## 2023-01-31 DIAGNOSIS — E11.8 TYPE 2 DIABETES MELLITUS WITH COMPLICATION, WITHOUT LONG-TERM CURRENT USE OF INSULIN (H): ICD-10-CM

## 2023-01-31 RX ORDER — LEVOTHYROXINE SODIUM 175 UG/1
175 TABLET ORAL
Qty: 30 TABLET | Refills: 0 | Status: SHIPPED | OUTPATIENT
Start: 2023-01-31 | End: 2023-02-21

## 2023-01-31 RX ORDER — ATORVASTATIN CALCIUM 40 MG/1
40 TABLET, FILM COATED ORAL DAILY
Qty: 30 TABLET | Refills: 0 | Status: SHIPPED | OUTPATIENT
Start: 2023-01-31 | End: 2023-02-21

## 2023-01-31 RX ORDER — IRON POLYSACCHARIDE COMPLEX 150 MG
150 CAPSULE ORAL DAILY
Qty: 30 CAPSULE | Refills: 0 | Status: SHIPPED | OUTPATIENT
Start: 2023-01-31

## 2023-01-31 RX ORDER — WARFARIN SODIUM 5 MG/1
TABLET ORAL
Qty: 110 TABLET | Refills: 0 | Status: SHIPPED | OUTPATIENT
Start: 2023-01-31

## 2023-01-31 RX ORDER — ALBUTEROL SULFATE 90 UG/1
2 AEROSOL, METERED RESPIRATORY (INHALATION) EVERY 6 HOURS
Qty: 18 G | Refills: 0 | Status: SHIPPED | OUTPATIENT
Start: 2023-01-31 | End: 2023-02-21

## 2023-01-31 RX ORDER — POTASSIUM CHLORIDE 1500 MG/1
20 TABLET, EXTENDED RELEASE ORAL 2 TIMES DAILY
Qty: 60 TABLET | Refills: 0 | Status: SHIPPED | OUTPATIENT
Start: 2023-01-31 | End: 2023-02-21

## 2023-01-31 RX ORDER — LISINOPRIL 2.5 MG/1
2.5 TABLET ORAL DAILY
Qty: 30 TABLET | Refills: 0 | Status: SHIPPED | OUTPATIENT
Start: 2023-01-31

## 2023-01-31 NOTE — Clinical Note
Unable to find home care due to at capacity for payor. Patient is currently being admitted to [facility]. She will update insurance info at discharge and notify PCP if she needs home care assistance after discharge. Hospital may also find Home Care before she is discharged if needed.

## 2023-01-31 NOTE — TELEPHONE ENCOUNTER
This is an extremely unwell patient I met only via a telephone MD visit for the first time a week ago and I am not at all confidant regarding this patients treatment plan and evaluation and management. She desperately needs a follow up appointment with me , face to face encounter and without this I won't prescribe any of these medications past one month, 2 if we run into legitimate serious clinic appointment space problems. I absolutely won't go past 2 months without face to face encounter.    Prescription is sent to the pharmacy     I can't do medication reconciliation here    Options    1. Wait until appointment with me  2 ask for help with Uzma Oneil RPH, the [facility] pharmacist and if necessary I did sign orders for this task     This plan hasn't been discussed with Janelle or with patient as of yet , please review with patient     Devon Tran MD

## 2023-01-31 NOTE — PROGRESS NOTES
Community Health Worker Initial Outreach            CHW received VM back from [facility] and [facility].   Both are at capacity for this payor at this time.     Still waiting for response back from [facility].

## 2023-01-31 NOTE — TELEPHONE ENCOUNTER
[facility] calling regarding faxes that were sent over to request refill of medications.     I do not see anything in the patient's chart regarding this request, transferred to  line.    Keyana Reyes RN  [facility]

## 2023-01-31 NOTE — PROGRESS NOTES
Patient has [place] insurance-  Limited home care accessibility-Order for home care sent to [facility], [facility] and [facility] agencies.     CHW will wait for response from agencies if they have capacity for patient.      Vero Guzman, CHW  [place], [place], [place], [place] and [facility]  [phone]

## 2023-01-31 NOTE — TELEPHONE ENCOUNTER
Call transferred from [facility], pharmacy hung up during transfer.    Writer called pharmacy back. Pharmacy would like to do a med rec and have medications e-prescribed and sent over per request of patient. Writer completed med-rec with pharmacy.    Medications and pharmacy pended below for transfer.    VÍCTOR LyonN RN  [facility]

## 2023-01-31 NOTE — TELEPHONE ENCOUNTER
Reason for call:  Medication   If this is a refill request, has the caller requested the refill from the pharmacy already? Yes  Will the patient be using a [facility]? No  Name of the pharmacy and phone number for the current request: [facility] - [place], [place] - [address] [place] [address]    Name of the medication requested: [facility] - [place], [address]    The pharmacy needs medications sent over, they need to med rec and have the medications Escribed.     Other request: Will soft transfer back to the RN to explain.    Best Time:      Can we leave a detailed message on this number?  Not Applicable    Travel screening: Not Applicable

## 2023-02-01 DIAGNOSIS — E03.9 HYPOTHYROIDISM, UNSPECIFIED TYPE: ICD-10-CM

## 2023-02-01 DIAGNOSIS — E11.69 DYSLIPIDEMIA ASSOCIATED WITH TYPE 2 DIABETES MELLITUS (H): ICD-10-CM

## 2023-02-01 DIAGNOSIS — I48.20 CHRONIC ATRIAL FIBRILLATION (H): ICD-10-CM

## 2023-02-01 DIAGNOSIS — E66.2 OBESITY HYPOVENTILATION SYNDROME (H): ICD-10-CM

## 2023-02-01 DIAGNOSIS — E78.5 DYSLIPIDEMIA ASSOCIATED WITH TYPE 2 DIABETES MELLITUS (H): ICD-10-CM

## 2023-02-01 RX ORDER — ALBUTEROL SULFATE 90 UG/1
AEROSOL, METERED RESPIRATORY (INHALATION)
Qty: 18 G | Refills: 10 | OUTPATIENT
Start: 2023-02-01

## 2023-02-01 NOTE — TELEPHONE ENCOUNTER
Please notify patient of provider's message below and assist with scheduling an in-person appointment with Devon Harley sometime within the next 2 months.  Please let her know that [facility] pharmacist will reach out to help us review her meds    Joseph Soria RN  [facility]

## 2023-02-01 NOTE — PROGRESS NOTES
CHW spoke with patient. She is currently in the ED at [place] waiting to be admitted.   Notified patient that we need updated insurance information to assist in finding home care if needed after her hospital discharge. Hospital  can also place a new order before discharge.     Home care was not found due to payor capacity. She is currently being admitted.     No further outreach

## 2023-02-02 ENCOUNTER — TELEPHONE (OUTPATIENT)
Dept: FAMILY MEDICINE | Facility: CLINIC | Age: 63
End: 2023-02-02
Payer: MEDICAID

## 2023-02-02 NOTE — TELEPHONE ENCOUNTER
MTM referral from: [facility] visit (referral by provider)    MTM referral outreach attempt #2 on February 2, 2023 at 11:04 AM      Outcome: Patient not reachable after several attempts, will route to [facility] Pharmacist/Provider as an FYI.  MT scheduling number is [phone].  Thank you for the referral.    Chucho Berg, MTM coordinator    PT IS PRIVATE PAY

## 2023-02-03 NOTE — TELEPHONE ENCOUNTER
Patient called and informed of message.    Patient said she is currently in the hospital ([place]).  Not sure when she will be discharged.    Hospital f/u scheduled for Friday, February 24th at 11 a.m., with Dr. Tran.      Juana Abraham,

## 2023-02-06 ENCOUNTER — TELEPHONE (OUTPATIENT)
Dept: CARDIOLOGY | Facility: CLINIC | Age: 63
End: 2023-02-06
Payer: MEDICAID

## 2023-02-06 DIAGNOSIS — E03.9 HYPOTHYROIDISM, UNSPECIFIED TYPE: ICD-10-CM

## 2023-02-06 DIAGNOSIS — E11.69 DYSLIPIDEMIA ASSOCIATED WITH TYPE 2 DIABETES MELLITUS (H): ICD-10-CM

## 2023-02-06 DIAGNOSIS — I48.20 CHRONIC ATRIAL FIBRILLATION (H): ICD-10-CM

## 2023-02-06 DIAGNOSIS — E78.5 DYSLIPIDEMIA ASSOCIATED WITH TYPE 2 DIABETES MELLITUS (H): ICD-10-CM

## 2023-02-06 DIAGNOSIS — D50.8 OTHER IRON DEFICIENCY ANEMIA: ICD-10-CM

## 2023-02-06 NOTE — TELEPHONE ENCOUNTER
NOTES STATUS DETAILS   OFFICE NOTE from referring provider  Internal Referred by Amparo Mclean PA-C   OFFICE NOTE from other specialist   Care Everywhere FABRICE Navarro CNP   DISCHARGE SUMMARY from hospital  Care Everywhere 11/27/21 - 12/11/21 Coronary Angiogram at [facility]    MEDICATION LIST In EPIC    DIAGNOSTIC PROCEDURES     Chronic Anticoagulation   Chronic atrial fibrillation  Chronic combined systolic and diastolic congestive heart failure Internal    IMAGING (DISC & REPORT)      CT  Available in PACS CT Chest Angio - 01/22/14 and 12/03/21   ULTRASOUND  Available in PACS Echo - 01/23/14, 03/10/17, 03/03/20, 12/01/21, 12/08/21

## 2023-02-07 RX ORDER — LEVOTHYROXINE SODIUM 175 UG/1
TABLET ORAL
Qty: 30 TABLET | Refills: 10 | OUTPATIENT
Start: 2023-02-07

## 2023-02-07 RX ORDER — POTASSIUM CHLORIDE 1500 MG/1
TABLET, EXTENDED RELEASE ORAL
Qty: 60 TABLET | Refills: 10 | OUTPATIENT
Start: 2023-02-07

## 2023-02-07 RX ORDER — ATORVASTATIN CALCIUM 40 MG/1
TABLET, FILM COATED ORAL
Qty: 30 TABLET | Refills: 10 | OUTPATIENT
Start: 2023-02-07

## 2023-02-07 RX ORDER — IRON POLYSACCHARIDE COMPLEX 150 MG
CAPSULE ORAL
Qty: 30 CAPSULE | Refills: 10 | OUTPATIENT
Start: 2023-02-07

## 2023-02-07 NOTE — TELEPHONE ENCOUNTER
Refills remain on file. Refused.     Doris Ware, RN, BSN, PHN  [facility]: [place]

## 2023-02-08 ENCOUNTER — DOCUMENTATION ONLY (OUTPATIENT)
Dept: ANTICOAGULATION | Facility: CLINIC | Age: 63
End: 2023-02-08

## 2023-02-08 NOTE — PROGRESS NOTES
ANTICOAGULATION     Marlene GARLAND Tylor is overdue for INR check.      Was unable to reach patient and was unable to leave a voicemail. If patient calls, please schedule INR check as soon as possible.     Sun Snyder RN

## 2023-02-15 ENCOUNTER — DOCUMENTATION ONLY (OUTPATIENT)
Dept: ANTICOAGULATION | Facility: CLINIC | Age: 63
End: 2023-02-15
Payer: MEDICAID

## 2023-02-15 NOTE — PROGRESS NOTES
ANTICOAGULATION     Marlene Snider is overdue for INR check.      Reminder letter sent    Jaki Vasquez RN

## 2023-02-15 NOTE — LETTER
[facility] ANTICOAGULATION CLINIC  [address] [facility] [address]  Phone: [phone]  Fax: [phone]   February 15, 2023        Marlene Snider  [address] [place] [address]            Dear Marlene,    You are currently under the care of [facility] for your warfarin (Coumadin , Jantoven ) therapy.  We are contacting you because our records show you were due for an INR on 1/26/23.    There are potentially serious risks when taking warfarin without careful monitoring and we want to make sure you are safely managed.  Routine lab monitoring is required for warfarin refills.     Please call [phone]  as soon as possible to schedule an appointment.  If there has been a change in your care or other concerns, please let us know so we can help and or update our records.     Sincerely,       [facility]

## 2023-02-21 RX ORDER — ALBUTEROL SULFATE 90 UG/1
2 AEROSOL, METERED RESPIRATORY (INHALATION) EVERY 6 HOURS
Qty: 18 G | Refills: 1 | Status: SHIPPED | OUTPATIENT
Start: 2023-02-21

## 2023-02-21 RX ORDER — POTASSIUM CHLORIDE 1500 MG/1
20 TABLET, EXTENDED RELEASE ORAL 2 TIMES DAILY
Qty: 180 TABLET | Refills: 1 | Status: SHIPPED | OUTPATIENT
Start: 2023-02-21

## 2023-02-21 RX ORDER — ATORVASTATIN CALCIUM 40 MG/1
40 TABLET, FILM COATED ORAL DAILY
Qty: 90 TABLET | Refills: 1 | Status: SHIPPED | OUTPATIENT
Start: 2023-02-21

## 2023-02-21 RX ORDER — LEVOTHYROXINE SODIUM 175 UG/1
175 TABLET ORAL
Qty: 90 TABLET | Refills: 1 | Status: SHIPPED | OUTPATIENT
Start: 2023-02-21

## 2023-02-21 NOTE — TELEPHONE ENCOUNTER
Medication Question or Refill    Contacts       Type Contact Phone/Fax    02/01/2023 05:02 AM CST Interface (Incoming) [address] [phone]    02/21/2023 02:01 PM CST Phone (Incoming) [address] [phone]          What medication are you calling about (include dose and sig)?: Atorvastatin 40mg tablets Levothyroxine 175 tablets  Potassium Chloride 20 maq Ventaline Inhaler 90 micrograms    Preferred Pharmacy:   [place] [address]  Phone: [phone] Fax: [phone]      Controlled Substance Agreement on file:   CSA -- Patient Level:    CSA: None found at the patient level.       Who prescribed the medication?: Mike Tran    Do you need a refill? Yes    When did you use the medication last? 2/21/23    Patient offered an appointment? No    Do you have any questions or concerns?  No      Could we send this information to you in [facility] or would you prefer to receive a phone call?:   No preference   Okay to leave a detailed message?: Yes at Home number on file [phone] (home)

## 2023-03-01 ENCOUNTER — DOCUMENTATION ONLY (OUTPATIENT)
Dept: ANTICOAGULATION | Facility: CLINIC | Age: 63
End: 2023-03-01
Payer: MEDICAID

## 2023-03-01 DIAGNOSIS — Z79.01 CHRONIC ANTICOAGULATION: Primary | ICD-10-CM

## 2023-03-01 NOTE — PROGRESS NOTES
ANTICOAGULATION  MANAGEMENT    Marlene Snider is being discharged from the [facility] ([facility]).    Reason for discharge: , patient passed away at most recent hospital stay    Anticoagulation episode resolved, ACC referral closed and Standing order discontinued        Jaki Vasquez RN

## 2023-04-13 ENCOUNTER — MEDICAL CORRESPONDENCE (OUTPATIENT)
Dept: HEALTH INFORMATION MANAGEMENT | Facility: CLINIC | Age: 63
End: 2023-04-13
Payer: MEDICAID

## 2023-04-20 ENCOUNTER — TELEPHONE (OUTPATIENT)
Dept: FAMILY MEDICINE | Facility: CLINIC | Age: 63
End: 2023-04-20

## 2023-04-20 NOTE — LETTER
June 19, 2023    To  Marlene Snider  [address] [place] [address]        Your team at [facility] cares about your health. We have reviewed your chart and based on our findings; we are making the following recommendations to better manage your health.     You are in particular need of attention regarding the following:     Schedule Annual MAMMOGRAPHY. [facility] scheduling number is [phone] or schedule in [facility] (self referral).  Schedule a primary care office visit with your provider for a Pap Smear to screen for Cervical Cancer.  PREVENTATIVE VISIT: Physical    If you have already completed these items, please contact the clinic via phone or   We[facility] so your care team can review and update your records. Thank you for   choosing [facility] for your healthcare needs. For any questions,   concerns, or to schedule an appointment please contact our clinic.    Healthy Regards,      Your [facility] Care Team

## 2023-04-20 NOTE — TELEPHONE ENCOUNTER
Patient Quality Outreach    Patient is due for the following:   Breast Cancer Screening - Mammogram  Cervical Cancer Screening - PAP Needed  Physical Preventive Adult Physical    Next Steps:   Schedule a Adult Preventative    Type of outreach:    Sent diaDexus message.    Next Steps:  Reach out within 90 days via Letter.    Max number of attempts reached: Yes. Will try again in 90 days if patient still on fail list.    Questions for provider review:    None     Mallika Solitario CMA  Chart routed to n/a.

## 2023-04-20 NOTE — LETTER
August 31, 2023    To  Marlene Snider  [address] [place] [address]      Your team at [facility] cares about your health. We have reviewed your chart and based on our findings; we are making the following recommendations to better manage your health.     You are in particular need of attention regarding the following:     Schedule Annual MAMMOGRAPHY. [facility] scheduling number is [phone] or schedule in [facility]Around[facility] (self referral).  Schedule a primary care office visit with your provider for a Pap Smear to screen for Cervical Cancer.  PREVENTATIVE VISIT: Physical    If you have already completed these items, please contact the clinic via phone or   CallAroundhart so your care team can review and update your records. Thank you for   choosing [facility] for your healthcare needs. For any questions,   concerns, or to schedule an appointment please contact our clinic.    Healthy Regards,      Your [facility] Care Team

## 2023-04-22 ENCOUNTER — HEALTH MAINTENANCE LETTER (OUTPATIENT)
Age: 63
End: 2023-04-22

## 2023-06-19 NOTE — TELEPHONE ENCOUNTER
Patient Quality Outreach    Patient is due for the following:   Breast Cancer Screening - Mammogram  Cervical Cancer Screening - PAP Needed  Physical Preventive Adult Physical    Next Steps:   Schedule a Adult Preventative    Type of outreach:    Sent letter.    Next Steps:  Reach out within 90 days via Phone.    Max number of attempts reached: No. Will try again in 90 days if patient still on fail list.    Questions for provider review:    None           Mallika Solitario, [facility]  Chart routed to none.

## 2023-08-31 NOTE — TELEPHONE ENCOUNTER
Patient Quality Outreach    Patient is due for the following:   Breast Cancer Screening - Mammogram  Cervical Cancer Screening - PAP Needed  Physical Preventive Adult Physical    Next Steps:   Schedule a Adult Preventative    Type of outreach:    letter    Next Steps:  Reach out within 90 days via  none .    Max number of attempts reached: Yes. Will try again in 90 days if patient still on fail list.    Questions for provider review:    None           Mallika Solitario, [facility]  Chart routed to none.

## 2023-09-17 ENCOUNTER — HEALTH MAINTENANCE LETTER (OUTPATIENT)
Age: 63
End: 2023-09-17

## 2024-02-04 ENCOUNTER — HEALTH MAINTENANCE LETTER (OUTPATIENT)
Age: 64
End: 2024-02-04

## 2024-06-23 ENCOUNTER — HEALTH MAINTENANCE LETTER (OUTPATIENT)
Age: 64
End: 2024-06-23

## 2024-11-10 ENCOUNTER — HEALTH MAINTENANCE LETTER (OUTPATIENT)
Age: 64
End: 2024-11-10

## 2025-01-04 ENCOUNTER — HEALTH MAINTENANCE LETTER (OUTPATIENT)
Age: 65
End: 2025-01-04

## 2025-03-02 ENCOUNTER — HEALTH MAINTENANCE LETTER (OUTPATIENT)
Age: 65
End: 2025-03-02

## 2025-06-21 ENCOUNTER — HEALTH MAINTENANCE LETTER (OUTPATIENT)
Age: 65
End: 2025-06-21